# Patient Record
Sex: MALE | Race: ASIAN | NOT HISPANIC OR LATINO | Employment: FULL TIME | URBAN - METROPOLITAN AREA
[De-identification: names, ages, dates, MRNs, and addresses within clinical notes are randomized per-mention and may not be internally consistent; named-entity substitution may affect disease eponyms.]

---

## 2017-08-30 ENCOUNTER — COMMUNICATION - HEALTHEAST (OUTPATIENT)
Dept: FAMILY MEDICINE | Facility: CLINIC | Age: 32
End: 2017-08-30

## 2017-08-30 ENCOUNTER — OFFICE VISIT - HEALTHEAST (OUTPATIENT)
Dept: FAMILY MEDICINE | Facility: CLINIC | Age: 32
End: 2017-08-30

## 2017-08-30 DIAGNOSIS — J30.9 ALLERGIC RHINITIS: ICD-10-CM

## 2017-08-30 DIAGNOSIS — Z00.00 ROUTINE GENERAL MEDICAL EXAMINATION AT A HEALTH CARE FACILITY: ICD-10-CM

## 2017-08-30 DIAGNOSIS — Z02.89 REFUGEE HEALTH EXAMINATION: ICD-10-CM

## 2017-08-30 DIAGNOSIS — Z23 NEED FOR IMMUNIZATION AGAINST INFLUENZA: ICD-10-CM

## 2017-08-30 LAB
CHOLEST SERPL-MCNC: 118 MG/DL
FASTING STATUS PATIENT QL REPORTED: YES
HDLC SERPL-MCNC: 37 MG/DL
LDLC SERPL CALC-MCNC: 69 MG/DL
TRIGL SERPL-MCNC: 61 MG/DL

## 2017-08-30 ASSESSMENT — MIFFLIN-ST. JEOR: SCORE: 1525.16

## 2017-08-31 LAB — HBV SURFACE AB SERPL IA-ACNC: NEGATIVE M[IU]/ML

## 2017-09-01 LAB
HBV CORE AB SERPL QL IA: POSITIVE
HBV SURFACE AG SERPL QL IA: ABNORMAL

## 2017-09-22 ENCOUNTER — OFFICE VISIT - HEALTHEAST (OUTPATIENT)
Dept: FAMILY MEDICINE | Facility: CLINIC | Age: 32
End: 2017-09-22

## 2017-09-22 DIAGNOSIS — Z23 NEED FOR IMMUNIZATION AGAINST INFLUENZA: ICD-10-CM

## 2017-09-22 DIAGNOSIS — F43.10 PTSD (POST-TRAUMATIC STRESS DISORDER): ICD-10-CM

## 2017-09-22 DIAGNOSIS — F80.1 EXPRESSIVE LANGUAGE DISORDER: ICD-10-CM

## 2017-09-22 DIAGNOSIS — F41.9 ANXIETY: ICD-10-CM

## 2017-09-22 ASSESSMENT — MIFFLIN-ST. JEOR: SCORE: 1541.03

## 2017-09-27 ENCOUNTER — OFFICE VISIT - HEALTHEAST (OUTPATIENT)
Dept: FAMILY MEDICINE | Facility: CLINIC | Age: 32
End: 2017-09-27

## 2017-09-27 DIAGNOSIS — F80.1 EXPRESSIVE LANGUAGE DISORDER: ICD-10-CM

## 2017-09-27 DIAGNOSIS — F43.10 PTSD (POST-TRAUMATIC STRESS DISORDER): ICD-10-CM

## 2017-09-27 DIAGNOSIS — F41.9 ANXIETY: ICD-10-CM

## 2017-09-27 ASSESSMENT — MIFFLIN-ST. JEOR: SCORE: 1535.36

## 2017-10-10 ENCOUNTER — OFFICE VISIT - HEALTHEAST (OUTPATIENT)
Dept: FAMILY MEDICINE | Facility: CLINIC | Age: 32
End: 2017-10-10

## 2017-10-10 DIAGNOSIS — Z00.00 ROUTINE ADULT HEALTH MAINTENANCE: ICD-10-CM

## 2017-10-10 DIAGNOSIS — F43.10 PTSD (POST-TRAUMATIC STRESS DISORDER): ICD-10-CM

## 2017-10-10 DIAGNOSIS — F41.9 ANXIETY: ICD-10-CM

## 2017-10-10 DIAGNOSIS — B19.10 HEP B W/O COMA: ICD-10-CM

## 2017-10-10 DIAGNOSIS — F80.1 EXPRESSIVE LANGUAGE DISORDER: ICD-10-CM

## 2017-10-10 LAB — AST SERPL W P-5'-P-CCNC: 36 U/L (ref 0–40)

## 2017-10-10 ASSESSMENT — MIFFLIN-ST. JEOR: SCORE: 1528.56

## 2017-10-11 LAB
AFP SERPL-MCNC: 2.3 UG/ML
HAV IGG SER QL IA: POSITIVE
HBV DNA DETECT/QUANT, S: 2830 IU/ML

## 2017-11-17 ENCOUNTER — OFFICE VISIT - HEALTHEAST (OUTPATIENT)
Dept: FAMILY MEDICINE | Facility: CLINIC | Age: 32
End: 2017-11-17

## 2017-11-17 DIAGNOSIS — J30.9 ALLERGIC RHINITIS: ICD-10-CM

## 2017-11-17 DIAGNOSIS — F43.10 PTSD (POST-TRAUMATIC STRESS DISORDER): ICD-10-CM

## 2017-11-17 DIAGNOSIS — F80.1 EXPRESSIVE LANGUAGE DISORDER: ICD-10-CM

## 2017-12-13 ENCOUNTER — RECORDS - HEALTHEAST (OUTPATIENT)
Dept: ADMINISTRATIVE | Facility: OTHER | Age: 32
End: 2017-12-13

## 2017-12-17 ENCOUNTER — RECORDS - HEALTHEAST (OUTPATIENT)
Dept: ADMINISTRATIVE | Facility: OTHER | Age: 32
End: 2017-12-17

## 2017-12-19 ENCOUNTER — COMMUNICATION - HEALTHEAST (OUTPATIENT)
Dept: FAMILY MEDICINE | Facility: CLINIC | Age: 32
End: 2017-12-19

## 2017-12-22 ENCOUNTER — COMMUNICATION - HEALTHEAST (OUTPATIENT)
Dept: FAMILY MEDICINE | Facility: CLINIC | Age: 32
End: 2017-12-22

## 2018-01-24 ENCOUNTER — RECORDS - HEALTHEAST (OUTPATIENT)
Dept: ADMINISTRATIVE | Facility: OTHER | Age: 33
End: 2018-01-24

## 2018-04-18 ENCOUNTER — COMMUNICATION - HEALTHEAST (OUTPATIENT)
Dept: FAMILY MEDICINE | Facility: CLINIC | Age: 33
End: 2018-04-18

## 2018-04-18 ENCOUNTER — OFFICE VISIT - HEALTHEAST (OUTPATIENT)
Dept: FAMILY MEDICINE | Facility: CLINIC | Age: 33
End: 2018-04-18

## 2018-04-18 DIAGNOSIS — B18.1 HEP B W/ COMA, CHRONIC, W/O DELT: ICD-10-CM

## 2018-04-18 DIAGNOSIS — J30.9 ALLERGIC RHINITIS: ICD-10-CM

## 2018-04-18 RX ORDER — CETIRIZINE HYDROCHLORIDE, PSEUDOEPHEDRINE HYDROCHLORIDE 5; 120 MG/1; MG/1
1 TABLET, FILM COATED, EXTENDED RELEASE ORAL 2 TIMES DAILY
Qty: 60 TABLET | Refills: 2 | Status: SHIPPED | OUTPATIENT
Start: 2018-04-18

## 2018-04-18 ASSESSMENT — MIFFLIN-ST. JEOR: SCORE: 1542.17

## 2018-06-12 ENCOUNTER — RECORDS - HEALTHEAST (OUTPATIENT)
Dept: ADMINISTRATIVE | Facility: OTHER | Age: 33
End: 2018-06-12

## 2018-06-14 ENCOUNTER — RECORDS - HEALTHEAST (OUTPATIENT)
Dept: ADMINISTRATIVE | Facility: OTHER | Age: 33
End: 2018-06-14

## 2018-07-03 ENCOUNTER — RECORDS - HEALTHEAST (OUTPATIENT)
Dept: ADMINISTRATIVE | Facility: OTHER | Age: 33
End: 2018-07-03

## 2018-10-26 ENCOUNTER — AMBULATORY - HEALTHEAST (OUTPATIENT)
Dept: NURSING | Facility: CLINIC | Age: 33
End: 2018-10-26

## 2020-06-23 ENCOUNTER — OFFICE VISIT - HEALTHEAST (OUTPATIENT)
Dept: FAMILY MEDICINE | Facility: CLINIC | Age: 35
End: 2020-06-23

## 2020-06-23 DIAGNOSIS — F41.9 ANXIETY: ICD-10-CM

## 2020-06-23 DIAGNOSIS — B18.1 HEP B W/ COMA, CHRONIC, W/O DELT: ICD-10-CM

## 2020-06-23 DIAGNOSIS — F43.10 PTSD (POST-TRAUMATIC STRESS DISORDER): ICD-10-CM

## 2021-05-31 VITALS — BODY MASS INDEX: 19.66 KG/M2 | HEIGHT: 69 IN | WEIGHT: 132.75 LBS

## 2021-05-31 VITALS — WEIGHT: 132 LBS | HEIGHT: 69 IN | BODY MASS INDEX: 19.55 KG/M2

## 2021-05-31 VITALS — HEIGHT: 69 IN | WEIGHT: 133.75 LBS | BODY MASS INDEX: 19.81 KG/M2

## 2021-05-31 VITALS — BODY MASS INDEX: 19.88 KG/M2 | WEIGHT: 134.25 LBS | HEIGHT: 69 IN

## 2021-06-01 VITALS — WEIGHT: 135.75 LBS | HEIGHT: 69 IN | BODY MASS INDEX: 20.11 KG/M2

## 2021-06-09 NOTE — PROGRESS NOTES
"Tad Gaines is a 35 y.o. male who is being evaluated via a billable telephone visit.      The patient has been notified of following:     \"This telephone visit will be conducted via a call between you and your physician/provider. We have found that certain health care needs can be provided without the need for a physical exam.  This service lets us provide the care you need with a short phone conversation.  If a prescription is necessary we can send it directly to your pharmacy.  If lab work is needed we can place an order for that and you can then stop by our lab to have the test done at a later time.    Telephone visits are billed at different rates depending on your insurance coverage. During this emergency period, for some insurers they may be billed the same as an in-person visit.  Please reach out to your insurance provider with any questions.    If during the course of the call the physician/provider feels a telephone visit is not appropriate, you will not be charged for this service.\"    Patient has given verbal consent to a Telephone visit? Yes    What phone number would you like to be contacted at? [phone]    Patient would like to receive their AVS by AVS Preference: [facility].    Additional provider notes: off work   Off work x 1 month he tested negative but he had fevers, so they told him to take off. He was told he had pneumonia.   Ready to return to work and needs a letter saying he is fine to return to work. He has been well for weeks.      Assessment/Plan:  1. Hep B w/ coma, chronic, w/o delt (H)  F/u needed, I reminded him     2. Anxiety  stable    3. PTSD (post-traumatic stress disorder)  stable    Work release written for him based on our discussion and my assessment that he's feeling well and has been well.      Phone call duration:  13 minutes  2:00 - 2:13  MD Jodi Marroquin MD    "

## 2021-06-12 NOTE — PROGRESS NOTES
Assessment:      Healthy male exam.      Plan:       allergies - take cetirizine or cetirizine with sudafed prn  All questions answered.  Testicular self exam technique reviewed and patient encouraged to perform self-exam monthly.  Discussed healthy lifestyle modifications.  try to obtain immunizations from the [facility] where he did his refugee exam              Subjective:      Tad Gaines is a 32 y.o. male who presents for an annual exam. The patient reports that there is not domestic violence in his life. He wants to establish care.  He notes occasional itchy nose. Sometimes he also has congestion. He would like medication to help this.    Healthy Habits:   Regular Exercise: Yes  Sunscreen Use: No   Healthy Diet: Yes  Dental Visits Regularly: Just one  Seat Belt: Yes  Sexually active: Yes  Monthly Self Testicular Exams:  Yes  Hemoccults: unknown  Flex Sig: No  Colonoscopy: No  Lipid Profile: unknokwn  Glucose Screen: unknown  Prevention of Osteoporosis: No  Last Dexa: No  Guns at Home:  No      Immunization History   Administered Date(s) Administered     Influenza, seasonal,quad inj 36+ mos 08/30/2017     Immunization status: up to date and documented, unknown.    No exam data present     Current Outpatient Prescriptions   Medication Sig Dispense Refill     acetaminophen (TYLENOL) 325 MG tablet Take 2 tablets (650 mg total) by mouth every 4 (four) hours as needed for pain. 100 tablet 2     cetirizine (ZYRTEC) 10 MG tablet Take 1 tablet (10 mg total) by mouth daily. 30 tablet 2     cetirizine-pseudoephedrine (ZYRTEC-D) 5-120 mg per tablet Take 1 tablet by mouth 2 (two) times a day. 30 tablet 1     ibuprofen (ADVIL,MOTRIN) 600 MG tablet Take 1 tablet (600 mg total) by mouth every 6 (six) hours as needed for pain. 60 tablet 2     naproxen (NAPROSYN) 500 MG tablet Take 500 mg by mouth 2 (two) times a day as needed.       No current facility-administered medications for this visit.      No past medical history on  "file.  Past Surgical History:   Procedure Laterality Date     NO PAST SURGERIES       Review of patient's allergies indicates no known allergies.  History reviewed. No pertinent family history.  Social History     Social History     Marital status:      Spouse name: Caitlyn Dewitt     Number of children: 2     Years of education: N/A     Occupational History     Not on file.     Social History Main Topics     Smoking status: Never Smoker     Smokeless tobacco: Never Used     Alcohol use No     Drug use: No     Sexual activity: Yes     Partners: Female     Other Topics Concern     Not on file     Social History Narrative    As of 9/2017        Arrival in [facility]: 2010 to [place], [place], then moved to [place]        Living situation: lives with wife, and 2 girls            Family not in [place]: none    Family in [place]: [place]            Past employment: [employer]; 2nd shift            Place of birth: [facility]            Education: none            [employer]: [employer]           Review of Systems  Review of Systems          Objective:     Vitals:    08/30/17 0804   BP: 98/68   Pulse: 68   Temp: 98.4  F (36.9  C)   TempSrc: Oral   SpO2: 98%   Weight: 132 lb (59.9 kg)   Height: 5' 8.75\" (1.746 m)     Body mass index is 19.64 kg/(m^2).    Physical  Physical Exam    Head - Normal  Eyes- PERRL, EOMI, normal eye exam.  ENT-tympanic membranes are clear bilaterally. Mouth shows betel-nut stained teeth. Oropharynx is clear.  Neck-supple, no palpable mass or lymphadenopathy.  CV-regular rate and rhythm with no murmur, femoral pulses palpable.  Respiratory-lungs clear to auscultation.  Abdomen-soft, nontender, no palpable masses or organomegaly.  Genitourinary-descended testes bilaterally normal to palpation, normal uncirc'd penis, no hernia bulge with cough  Extremities-warm with no edema.  Neurologic- strength and sensation are symmetric.  Skin-no atypical appearing lesions, no rash          "

## 2021-06-13 NOTE — PROGRESS NOTES
OFFICE VISIT NOTE      Assessment & Plan   Tad Gaines is a 32 y.o. male who has tried to learn English and has tried to take the citizenship exam, but cannot pass. He seeks help getting a waiver so he can obtain citizenship. He has an appointment on Oct. 2nd and if he does not pass, he has to start over.    He explained how he has lots of thoughts in his head which are completely different when he expresses them. Things he says do not represent his thoughts. While we talked, I tried a few bits of education and after some other topics, I asked him to repeat back to me what I had told him. He was completely unable to do this. I gave instructions on how to take medication I'd prescribed and he could not repeat it back. Using basic symbols, he put markings on the labels to help him remember when and how often to taken them.    We also wrote down 3 of the reasons he cannot pass the citizenship test. I wrote the diagnoses in English and then he made marks and symbols to remind him of the problems he has. For PTSD he bell a bullet! Expressive disorder he bell a face and a heart. For anxiety he bell a face with a frazzled mouth.  Diagnoses and all orders for this visit:    Need for immunization against influenza  -     Influenza, Seasonal Quad, Preservative Free 36+ Months    Expressive language disorder    PTSD (post-traumatic stress disorder)    Anxiety        Jodi Vaughan MD              Subjective:   Chief Complaint:  Follow-up    32 y.o. male.     1) he has papers stating he has to re-submit the [place]-648 form as the last one had insufficient information.  He says he has taken the test 2 times and he has one more chance or he has to start the process over.    He says he was told he had to have his primary doctor submit the paper with the psychologist's paper.  Counselor at [facility] completed one form for him.  Needs one paper from mental health, one paper from medical doctor.    Tim Chavez ()  thought part of the  "problem might be that he could not state what his problems are which keep him from taking the tests. He stated he has something in his brain but expressing it comes out totally differently    Current Outpatient Prescriptions   Medication Sig     acetaminophen (TYLENOL) 325 MG tablet Take 2 tablets (650 mg total) by mouth every 4 (four) hours as needed for pain.     cetirizine (ZYRTEC) 10 MG tablet Take 1 tablet (10 mg total) by mouth daily.     cetirizine-pseudoephedrine (ZYRTEC-D) 5-120 mg per tablet Take 1 tablet by mouth 2 (two) times a day.     ibuprofen (ADVIL,MOTRIN) 600 MG tablet Take 1 tablet (600 mg total) by mouth every 6 (six) hours as needed for pain.     naproxen (NAPROSYN) 500 MG tablet Take 500 mg by mouth 2 (two) times a day as needed.       PSFHx: appropriate sections of PMH completed/filled in   Tobacco Status:  He  reports that he has never smoked. He has never used smokeless tobacco.    Review of Systems:  No fever.  No rash.    Objective:    BP 94/62 (Patient Site: Left Arm, Patient Position: Sitting, Cuff Size: Adult Regular)  Pulse 69  Temp 97.9  F (36.6  C) (Oral)   Resp 18  Ht 5' 9.25\" (1.759 m)  Wt 133 lb 12 oz (60.7 kg)  SpO2 98%  BMI 19.61 kg/m2  GENERAL: No acute distress.  Mood: mostly good, sometimes sad  Judgment: fair  Insight: fair  Memory: poor --or expression is poor    Greater than 40 minutes spent with patient, with greater than 50% of time spent in counseling, consultation and care coordination regarding problems detailed above.          "

## 2021-06-13 NOTE — PROGRESS NOTES
OFFICE VISIT NOTE      Assessment & Plan   Tad Gaines is a 32 y.o. male with Hep B - needs to be on a regular visit schedule q6 mon  Start some immunizations  He's pleased he got the citizenship waiver!  Start Viread for Hep B if viral count is high    Diagnoses and all orders for this visit:    Hep B w/o coma  -     Hepatitis B Virus (HBV) DNA, Detection and Quantification by RT-PCR ($$$)  -     AFP-Tumor Marker  -     Alkaline Phosphatase; Future  -     AST (SGOT)  -     Ambulatory referral to Gastroenterology    Routine adult health maintenance  -     Hepatitis A Immune Status  -     Tdap vaccine,  6yo or older,  IM    Expressive language disorder    PTSD (post-traumatic stress disorder)    Anxiety    Other orders  -     Discontinue: tenofovir disoproxil (VIREAD) 300 mg tablet; Take 1 tablet (300 mg total) by mouth daily.  Dispense: 30 tablet; Refill: 6        Jodi Vaughan MD    The following low BMI interventions were performed this visit: weight gain advised          Subjective:   Chief Complaint:  Follow-up (meds and labs)    32 y.o. male.     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     cetirizine (ZYRTEC) 10 MG tablet Take 1 tablet (10 mg total) by mouth daily.     cetirizine-pseudoephedrine (ZYRTEC-D) 5-120 mg per tablet Take 1 tablet by mouth 2 (two) times a day.     ibuprofen (ADVIL,MOTRIN) 600 MG tablet Take 1 tablet (600 mg total) by mouth every 6 (six) hours  "as needed for pain.     naproxen (NAPROSYN) 500 MG tablet Take 500 mg by mouth 2 (two) times a day as needed.       PSFHx: appropriate sections of PMH completed/filled in   Tobacco Status:  He  reports that he has never smoked. He has never used smokeless tobacco.    Review of Systems:  No fever.  No diarrhea.    Objective:    /68 (Patient Site: Left Arm, Patient Position: Sitting, Cuff Size: Adult Regular)  Pulse 68  Temp 97.6  F (36.4  C) (Oral)   Resp 20  Ht 5' 8.75\" (1.746 m)  Wt 132 lb 12 oz (60.2 kg)  SpO2 100%  BMI 19.75 kg/m2  GENERAL: No acute distress.  Mood: good  Judgment: fair  Insight: good    Labs and u/s pending    Greater than 25 minutes spent with patient, with greater than 50% of time spent in counseling, consultation and care coordination regarding problems detailed above.    "

## 2021-06-14 NOTE — PROGRESS NOTES
OFFICE VISIT NOTE      Assessment & Plan   Tad Gaines is a 32 y.o. male here for follow up on his hepatitis B = we reviewed his hep B status which is good; I reminded him how we need to check him every 6 months ongoing.    Allergies/congestion - he brought in his medications but did not remember the options/how to use them. We reviewed this until he could speak back the correct way to use them. He also requested a nasal saline spray    Citizenship - he has been granted citizenship!!! The waiver went through. This is terrific for him.    Diagnoses and all orders for this visit:    Allergic rhinitis    Expressive language disorder    PTSD (post-traumatic stress disorder)    Other orders  -     sodium chloride 0.65 % Drop; 1 spray into each nostril every 1 (one) hour.  Dispense: 2 Bottle; Refill: 11        Jodi Vaughan MD              Subjective:   Chief Complaint:  Follow-up    32 y.o. male.     1) hep B - OK   Follow q 6 mon for cancer risk and if the virus becomes more prevalent    2) congestion - he brought his medications and we reviewed   3) citizenship - he got it!    Current Outpatient Prescriptions   Medication Sig     acetaminophen (TYLENOL) 325 MG tablet Take 2 tablets (650 mg total) by mouth every 4 (four) hours as needed for pain.     cetirizine (ZYRTEC) 10 MG tablet Take 1 tablet (10 mg total) by mouth daily.     cetirizine-pseudoephedrine (ZYRTEC-D) 5-120 mg per tablet Take 1 tablet by mouth 2 (two) times a day.     ibuprofen (ADVIL,MOTRIN) 600 MG tablet Take 1 tablet (600 mg total) by mouth every 6 (six) hours as needed for pain.     naproxen (NAPROSYN) 500 MG tablet Take 500 mg by mouth 2 (two) times a day as needed.     sodium chloride 0.65 % Drop 1 spray into each nostril every 1 (one) hour.       PSFHx: appropriate sections of PMH completed/filled in   Tobacco Status:  He  reports that he has never smoked. He has never used smokeless tobacco.    Review of Systems:  No fever.  No rash.    Objective:     /76 (Cuff Size: Adult Regular)  Pulse 78  Temp 97.6  F (36.4  C) (Oral)   SpO2 99%  GENERAL: No acute distress, more relaxed than previously  Mood: good  Judgment: fair  Insight: fair    Greater than 25 minutes spent with patient, with greater than 50% of time spent in counseling, consultation and care coordination regarding problems detailed above.

## 2021-06-16 PROBLEM — F80.1 EXPRESSIVE LANGUAGE DISORDER: Status: ACTIVE | Noted: 2017-09-22

## 2021-06-16 PROBLEM — F43.10 PTSD (POST-TRAUMATIC STRESS DISORDER): Status: ACTIVE | Noted: 2017-09-22

## 2021-06-16 PROBLEM — F41.9 ANXIETY: Status: ACTIVE | Noted: 2017-09-22

## 2021-06-17 NOTE — PROGRESS NOTES
"OFFICE VISIT NOTE      Assessment & Plan   Tad Gaines is a 33 y.o. male with Hep B - which [facility] is following him with. They will follow his labs every 6 months and do not recommend treatment at this time. Next appointment is in June.    Rhinitis - he has a runny nose again. He says the med I prescribed helped but he ran out. I prescribed more and helped him understand about refills.    Diagnoses and all orders for this visit:    Hep B w/ coma, chronic, w/o delt    Allergic rhinitis  -     cetirizine-pseudoephedrine (ZYRTEC-D) 5-120 mg per tablet; Take 1 tablet by mouth 2 (two) times a day.  Dispense: 60 tablet; Refill: 2        Jodi Vaughan MD              Subjective:   Chief Complaint:  Follow-up (Hep B)    33 y.o. male.     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     cetirizine (ZYRTEC) 10 MG tablet Take 1 tablet (10 mg total) by mouth daily.     cetirizine-pseudoephedrine (ZYRTEC-D) 5-120 mg per tablet Take 1 tablet by mouth 2 (two) times a day.     ibuprofen (ADVIL,MOTRIN) 600 MG tablet Take 1 tablet (600 mg total) by mouth every 6 (six) hours as needed for pain.     naproxen (NAPROSYN) 500 MG tablet Take 500 mg by mouth 2 (two) times a day as needed.     sodium chloride 0.65 % Drop 1 spray into each nostril every 1 (one) hour.       PSFHx: appropriate sections of PMH completed/filled in   Tobacco Status:  He  reports that he has never smoked. He has never used smokeless tobacco.    Review of Systems:  No fever.  No eye itching. No fatige.    Objective:    /62 (Patient Site: Left Arm, Patient Position: Sitting, Cuff Size: Adult Regular)  Pulse 74  Temp 97.5  F (36.4  C) (Oral)   Resp 18  Ht 5' 8.75\" (1.746 m) "  Wt 135 lb 12 oz (61.6 kg)  SpO2 99% Comment: RA  BMI 20.19 kg/m2  GENERAL: No acute distress.  Nose: clear drainage frequent  Ht: reg s1s2  Lungs: clear  Mood: calm/good    Greater than 25 minutes spent with patient, with greater than 50% of time spent in counseling, consultation and care coordination regarding problems detailed above.

## 2021-06-20 NOTE — LETTER
Letter by Jodi Vaughan MD at      Author: Jodi Vaughan MD Service: -- Author Type: --    Filed:  Encounter Date: 6/23/2020 Status: (Other)         June 23, 2020     Patient: Tad Gaines   YOB: 1985   Date of Visit: 6/23/2020       To Whom It May Concern:    It is my medical opinion that Tad Gaines may return to full duty immediately with no restrictions.    If you have any questions or concerns, please don't hesitate to call.    Sincerely,        Electronically signed by Jodi Vaughan MD

## 2021-06-27 ENCOUNTER — HEALTH MAINTENANCE LETTER (OUTPATIENT)
Age: 36
End: 2021-06-27

## 2021-10-17 ENCOUNTER — HEALTH MAINTENANCE LETTER (OUTPATIENT)
Age: 36
End: 2021-10-17

## 2022-02-17 PROBLEM — B18.1: Status: ACTIVE | Noted: 2018-04-18

## 2022-07-23 ENCOUNTER — HEALTH MAINTENANCE LETTER (OUTPATIENT)
Age: 37
End: 2022-07-23

## 2022-10-01 ENCOUNTER — HEALTH MAINTENANCE LETTER (OUTPATIENT)
Age: 37
End: 2022-10-01

## 2023-08-12 ENCOUNTER — HEALTH MAINTENANCE LETTER (OUTPATIENT)
Age: 38
End: 2023-08-12

## 2023-11-04 ENCOUNTER — IMMUNIZATION (OUTPATIENT)
Dept: FAMILY MEDICINE | Facility: CLINIC | Age: 38
End: 2023-11-04
Payer: MEDICAID

## 2023-11-04 PROCEDURE — 91320 SARSCV2 VAC 30MCG TRS-SUC IM: CPT

## 2023-11-04 PROCEDURE — 90686 IIV4 VACC NO PRSV 0.5 ML IM: CPT

## 2023-11-04 PROCEDURE — 90471 IMMUNIZATION ADMIN: CPT

## 2023-11-04 PROCEDURE — 90480 ADMN SARSCOV2 VAC 1/ONLY CMP: CPT

## 2024-07-16 ENCOUNTER — TELEPHONE (OUTPATIENT)
Dept: FAMILY MEDICINE | Facility: CLINIC | Age: 39
End: 2024-07-16
Payer: MEDICAID

## 2024-07-17 NOTE — TELEPHONE ENCOUNTER
Called pt and spoke to his wife (C2C on file). Relayed message and she verbalized understanding . Appt scheduled 12/10/24.        Solomon Schultz, BSN RN  [facility]

## 2024-07-17 NOTE — TELEPHONE ENCOUNTER
Please call Tad Gaines and invite him to come for an appointment sometime this winter. He has not been seen in years and it would be good to see him for a check up/physical as well as to assess his hep B.

## 2024-09-29 ENCOUNTER — HEALTH MAINTENANCE LETTER (OUTPATIENT)
Age: 39
End: 2024-09-29

## 2024-12-10 ENCOUNTER — OFFICE VISIT (OUTPATIENT)
Dept: FAMILY MEDICINE | Facility: CLINIC | Age: 39
End: 2024-12-10
Payer: COMMERCIAL

## 2024-12-10 ENCOUNTER — VIRTUAL VISIT (OUTPATIENT)
Dept: INTERPRETER SERVICES | Facility: CLINIC | Age: 39
End: 2024-12-10

## 2024-12-10 VITALS
SYSTOLIC BLOOD PRESSURE: 114 MMHG | TEMPERATURE: 98.2 F | DIASTOLIC BLOOD PRESSURE: 79 MMHG | OXYGEN SATURATION: 96 % | BODY MASS INDEX: 23.4 KG/M2 | WEIGHT: 158 LBS | HEIGHT: 69 IN | HEART RATE: 76 BPM | RESPIRATION RATE: 16 BRPM

## 2024-12-10 DIAGNOSIS — J30.1 NON-SEASONAL ALLERGIC RHINITIS DUE TO POLLEN: ICD-10-CM

## 2024-12-10 DIAGNOSIS — Z11.59 NEED FOR HEPATITIS C SCREENING TEST: ICD-10-CM

## 2024-12-10 DIAGNOSIS — B18.1 CHRONIC HEPATITIS B WITHOUT DELTA AGENT WITH HEPATIC COMA (H): ICD-10-CM

## 2024-12-10 DIAGNOSIS — Z00.00 PREVENTATIVE HEALTH CARE: Primary | ICD-10-CM

## 2024-12-10 DIAGNOSIS — Z23 NEED FOR VACCINATION: ICD-10-CM

## 2024-12-10 DIAGNOSIS — Z11.4 SCREENING FOR HIV (HUMAN IMMUNODEFICIENCY VIRUS): ICD-10-CM

## 2024-12-10 LAB
ALBUMIN UR-MCNC: NEGATIVE MG/DL
APPEARANCE UR: CLEAR
BASOPHILS # BLD AUTO: 0 10E3/UL (ref 0–0.2)
BASOPHILS NFR BLD AUTO: 1 %
BILIRUB UR QL STRIP: NEGATIVE
COLOR UR AUTO: YELLOW
EOSINOPHIL # BLD AUTO: 0.1 10E3/UL (ref 0–0.7)
EOSINOPHIL NFR BLD AUTO: 1 %
ERYTHROCYTE [DISTWIDTH] IN BLOOD BY AUTOMATED COUNT: 12.3 % (ref 10–15)
GLUCOSE UR STRIP-MCNC: NEGATIVE MG/DL
HCT VFR BLD AUTO: 44.2 % (ref 40–53)
HGB BLD-MCNC: 15.1 G/DL (ref 13.3–17.7)
HGB UR QL STRIP: NEGATIVE
IMM GRANULOCYTES # BLD: 0 10E3/UL
IMM GRANULOCYTES NFR BLD: 0 %
KETONES UR STRIP-MCNC: NEGATIVE MG/DL
LEUKOCYTE ESTERASE UR QL STRIP: NEGATIVE
LYMPHOCYTES # BLD AUTO: 1.8 10E3/UL (ref 0.8–5.3)
LYMPHOCYTES NFR BLD AUTO: 27 %
MCH RBC QN AUTO: 29.3 PG (ref 26.5–33)
MCHC RBC AUTO-ENTMCNC: 34.2 G/DL (ref 31.5–36.5)
MCV RBC AUTO: 86 FL (ref 78–100)
MONOCYTES # BLD AUTO: 0.4 10E3/UL (ref 0–1.3)
MONOCYTES NFR BLD AUTO: 7 %
NEUTROPHILS # BLD AUTO: 4.1 10E3/UL (ref 1.6–8.3)
NEUTROPHILS NFR BLD AUTO: 65 %
NITRATE UR QL: NEGATIVE
PH UR STRIP: 6 [PH] (ref 5–7)
PLATELET # BLD AUTO: 200 10E3/UL (ref 150–450)
RBC # BLD AUTO: 5.15 10E6/UL (ref 4.4–5.9)
SP GR UR STRIP: 1.01 (ref 1–1.03)
UROBILINOGEN UR STRIP-ACNC: 0.2 E.U./DL
WBC # BLD AUTO: 6.4 10E3/UL (ref 4–11)

## 2024-12-10 PROCEDURE — T1013 SIGN LANG/ORAL INTERPRETER: HCPCS | Mod: U4

## 2024-12-10 RX ORDER — CETIRIZINE HYDROCHLORIDE, PSEUDOEPHEDRINE HYDROCHLORIDE 5; 120 MG/1; MG/1
1 TABLET, FILM COATED, EXTENDED RELEASE ORAL 2 TIMES DAILY
Qty: 60 TABLET | Refills: 2 | Status: SHIPPED | OUTPATIENT
Start: 2024-12-10

## 2024-12-10 SDOH — HEALTH STABILITY: PHYSICAL HEALTH: ON AVERAGE, HOW MANY MINUTES DO YOU ENGAGE IN EXERCISE AT THIS LEVEL?: 0 MIN

## 2024-12-10 SDOH — HEALTH STABILITY: PHYSICAL HEALTH: ON AVERAGE, HOW MANY DAYS PER WEEK DO YOU ENGAGE IN MODERATE TO STRENUOUS EXERCISE (LIKE A BRISK WALK)?: 0 DAYS

## 2024-12-10 ASSESSMENT — SOCIAL DETERMINANTS OF HEALTH (SDOH): HOW OFTEN DO YOU GET TOGETHER WITH FRIENDS OR RELATIVES?: ONCE A WEEK

## 2024-12-10 NOTE — PROGRESS NOTES
"Preventive Care Visit  [facility] SHERITA Vaughan MD, Family Medicine  Dec 10, 2024      Assessment & Plan     Preventative health care  Completed today. He is doing quite well, has made healthy choices, but needs to come in occasionally to be checked with his hep B  - REVIEW OF HEALTH MAINTENANCE PROTOCOL ORDERS  - Hep B Virus DNA Quant Real Time PCR  - CBC with platelets and differential  - Comprehensive metabolic panel (BMP + Alb, Alk Phos, ALT, AST, Total. Bili, TP)  - Lipid panel reflex to direct LDL Non-fasting  - UA Macroscopic with reflex to Microscopic and Culture - Lab Collect    Screening for HIV (human immunodeficiency virus)  Screening only  - HIV Antigen Antibody Combo    Need for hepatitis C screening test  Screening only  - Hepatitis C Screen Reflex to HCV RNA Quant and Genotype    Hep B w/ coma, chronic, w/o delt  Await results  - Hep B Virus DNA Quant Real Time PCR  - CBC with platelets and differential  - Comprehensive metabolic panel (BMP + Alb, Alk Phos, ALT, AST, Total. Bili, TP)    Need for vaccination  given  - INFLUENZA VACCINE,SPLIT VIRUS,TRIVALENT,PF(FLUZONE)  - COVID-19 12+ (PFIZER)      Non-seasonal allergic rhinitis due to pollen  He prefers to take medication to help this.  - cetirizine-pseudoePHEDrine ER (ZYRTEC-D) 5-120 MG 12 hr tablet  Dispense: 60 tablet; Refill: 2                            Subjective   Tad is a 39 year old, presenting for the following:  Physical        12/10/2024     3:20 PM   Additional Questions   Roomed by yokasta ashley   Accompanied by Self          HPI  He had a past problem of thinking too much - says \"I am much better now. I do not struggle with it currently.\"    Hep b - not worried about it but is unsure what it means to have hep B    Still get some runny/itchy nose - would like to resume medication for that    Health Care Directive  Patient does not have a Health Care Directive: Advance Directive received and scanned. Click on Code " in the patient header to view.      12/10/2024   General Health   How would you rate your overall physical health? Good   Feel stress (tense, anxious, or unable to sleep) Not at all            12/10/2024   Nutrition   Three or more servings of calcium each day? (!) NO   Diet: Regular (no restrictions)   How many servings of fruit and vegetables per day? (!) 2-3   How many sweetened beverages each day? 0-1            12/10/2024   Exercise   Days per week of moderate/strenous exercise 0 days   Average minutes spent exercising at this level 0 min      (!) EXERCISE CONCERN      12/10/2024   Social Factors   Frequency of gathering with friends or relatives Once a week   Worry food won't last until get money to buy more No   Food not last or not have enough money for food? No   Do you have housing? (Housing is defined as stable permanent housing and does not include staying ouside in a car, in a tent, in an abandoned building, in an overnight shelter, or couch-surfing.) Yes   Are you worried about losing your housing? No   Lack of transportation? No   Unable to get utilities (heat,electricity)? No            12/10/2024   Dental   Dentist two times every year? (!) NO            12/10/2024   TB Screening   Were you born outside of the [place]? Yes            Today's PHQ-2 Score:       12/10/2024     3:20 PM   PHQ-2 ( 1999 [employer])   Q1: Little interest or pleasure in doing things 0    Q2: Feeling down, depressed or hopeless 0    PHQ-2 Score 0    Q1: Little interest or pleasure in doing things Not at all   Q2: Feeling down, depressed or hopeless Not at all   PHQ-2 Score 0       Patient-reported           12/10/2024   Substance Use   Alcohol more than 3/day or more than 7/wk No   Do you use any other substances recreationally? No        Social History     Tobacco Use    Smoking status: Never     Passive exposure: Never    Smokeless tobacco: Never   Vaping Use    Vaping status: Never Used   Substance Use Topics    Alcohol use: No     Drug use: No             12/10/2024   One time HIV Screening   Previous HIV test? I don't know          12/10/2024   STI Screening   New sexual partner(s) since last STI/HIV test? No            12/10/2024   Contraception/Family Planning   Questions about contraception or family planning No           Reviewed and updated as needed this visit by Provider     Meds  Problems  Med Hx   Fam Hx            Past Medical History:   Diagnosis Date    Allergic rhinitis     Anxiety     Chronic rhinitis     Expressive language disorder     Hep B w/o coma 10/2017    Hep B RNA followed by [facility]; no treatment recommended at this time; do u/s as of age 42; fibrosis scan over 6 2018    PTSD (post-traumatic stress disorder)      Past Surgical History:   Procedure Laterality Date    NO PAST SURGERIES       Lab work is in process  Labs reviewed in EPIC  BP Readings from Last 3 Encounters:   12/10/24 114/79    Wt Readings from Last 3 Encounters:   12/10/24 71.7 kg (158 lb)   04/18/18 61.6 kg (135 lb 12 oz)   10/10/17 60.2 kg (132 lb 12 oz)                  Patient Active Problem List   Diagnosis    Allergic rhinitis    Allergic conjunctivitis    Expressive language disorder    PTSD (post-traumatic stress disorder)    Anxiety    Hep B w/ coma, chronic, w/o delt     Past Surgical History:   Procedure Laterality Date    NO PAST SURGERIES         Social History     Tobacco Use    Smoking status: Never     Passive exposure: Never    Smokeless tobacco: Never   Substance Use Topics    Alcohol use: No     History reviewed. No pertinent family history.      Current Outpatient Medications   Medication Sig Dispense Refill    cetirizine-pseudoePHEDrine ER (ZYRTEC-D) 5-120 MG 12 hr tablet Take 1 tablet by mouth 2 times daily. 60 tablet 2    naproxen (NAPROSYN) 500 MG tablet [NAPROXEN (NAPROSYN) 500 MG TABLET] Take 500 mg by mouth 2 (two) times a day as needed. (Patient not taking: Reported on 12/10/2024)      sodium chloride 0.65 % Drop [SODIUM  "CHLORIDE 0.65 % DROP] 1 spray into each nostril every 1 (one) hour. (Patient not taking: Reported on 12/10/2024) 2 Bottle 11            Objective    Exam  /79   Pulse 76   Temp 98.2  F (36.8  C) (Oral)   Resp 16   Ht 1.74 m (5' 8.5\")   Wt 71.7 kg (158 lb)   SpO2 96%   BMI 23.67 kg/m     Estimated body mass index is 23.67 kg/m  as calculated from the following:    Height as of this encounter: 1.74 m (5' 8.5\").    Weight as of this encounter: 71.7 kg (158 lb).    Physical Exam  GENERAL: alert and no distress  EYES: Eyes grossly normal to inspection, PERRL and conjunctivae and sclerae normal  HENT: ear canals and TM's normal, nose and mouth without ulcers or lesions  NECK: no adenopathy, no asymmetry, masses, or scars  RESP: lungs clear to auscultation - no rales, rhonchi or wheezes  CV: regular rate and rhythm, normal S1 S2, no S3 or S4, no murmur, click or rub, no peripheral edema  ABDOMEN: soft, nontender, no hepatosplenomegaly, no masses and bowel sounds normal  MS: no gross musculoskeletal defects noted, no edema  SKIN: no suspicious lesions or rashes and xerosis - fingers  NEURO: Normal strength and tone, mentation intact and speech normal  PSYCH: mentation appears normal, affect normal/bright        Signed Electronically by: Jodi Vaughan MD    "

## 2024-12-11 LAB
ALBUMIN SERPL BCG-MCNC: 4.6 G/DL (ref 3.5–5.2)
ALP SERPL-CCNC: 121 U/L (ref 40–150)
ALT SERPL W P-5'-P-CCNC: 41 U/L (ref 0–70)
ANION GAP SERPL CALCULATED.3IONS-SCNC: 9 MMOL/L (ref 7–15)
AST SERPL W P-5'-P-CCNC: 41 U/L (ref 0–45)
BILIRUB SERPL-MCNC: 0.4 MG/DL
BUN SERPL-MCNC: 18.8 MG/DL (ref 6–20)
CALCIUM SERPL-MCNC: 9.3 MG/DL (ref 8.8–10.4)
CHLORIDE SERPL-SCNC: 101 MMOL/L (ref 98–107)
CHOLEST SERPL-MCNC: 150 MG/DL
CREAT SERPL-MCNC: 0.95 MG/DL (ref 0.67–1.17)
EGFRCR SERPLBLD CKD-EPI 2021: >90 ML/MIN/1.73M2
FASTING STATUS PATIENT QL REPORTED: NORMAL
FASTING STATUS PATIENT QL REPORTED: NORMAL
GLUCOSE SERPL-MCNC: 97 MG/DL (ref 70–99)
HBV DNA SERPL NAA+PROBE-ACNC: 5270 IU/ML
HBV DNA SERPL NAA+PROBE-LOG IU: 3.7 {LOG_IU}/ML
HCO3 SERPL-SCNC: 28 MMOL/L (ref 22–29)
HCV AB SERPL QL IA: NONREACTIVE
HDLC SERPL-MCNC: 42 MG/DL
HIV 1+2 AB+HIV1 P24 AG SERPL QL IA: NONREACTIVE
LDLC SERPL CALC-MCNC: 88 MG/DL
NONHDLC SERPL-MCNC: 108 MG/DL
POTASSIUM SERPL-SCNC: 4.3 MMOL/L (ref 3.4–5.3)
PROT SERPL-MCNC: 7.9 G/DL (ref 6.4–8.3)
SODIUM SERPL-SCNC: 138 MMOL/L (ref 135–145)
TRIGL SERPL-MCNC: 100 MG/DL